# Patient Record
Sex: MALE | Race: BLACK OR AFRICAN AMERICAN | NOT HISPANIC OR LATINO
[De-identification: names, ages, dates, MRNs, and addresses within clinical notes are randomized per-mention and may not be internally consistent; named-entity substitution may affect disease eponyms.]

---

## 2022-04-01 ENCOUNTER — RESULT REVIEW (OUTPATIENT)
Age: 47
End: 2022-04-01

## 2022-04-01 ENCOUNTER — OUTPATIENT (OUTPATIENT)
Dept: OUTPATIENT SERVICES | Facility: HOSPITAL | Age: 47
LOS: 1 days | Discharge: HOME | End: 2022-04-01
Payer: OTHER MISCELLANEOUS

## 2022-04-01 VITALS
HEART RATE: 94 BPM | OXYGEN SATURATION: 98 % | TEMPERATURE: 97 F | HEIGHT: 69 IN | RESPIRATION RATE: 14 BRPM | SYSTOLIC BLOOD PRESSURE: 137 MMHG | DIASTOLIC BLOOD PRESSURE: 87 MMHG | WEIGHT: 250 LBS

## 2022-04-01 DIAGNOSIS — Z78.9 OTHER SPECIFIED HEALTH STATUS: Chronic | ICD-10-CM

## 2022-04-01 DIAGNOSIS — M25.511 PAIN IN RIGHT SHOULDER: ICD-10-CM

## 2022-04-01 DIAGNOSIS — Z01.818 ENCOUNTER FOR OTHER PREPROCEDURAL EXAMINATION: ICD-10-CM

## 2022-04-01 LAB
ALBUMIN SERPL ELPH-MCNC: 4.3 G/DL — SIGNIFICANT CHANGE UP (ref 3.5–5.2)
ALP SERPL-CCNC: 74 U/L — SIGNIFICANT CHANGE UP (ref 30–115)
ALT FLD-CCNC: 35 U/L — SIGNIFICANT CHANGE UP (ref 0–41)
ANION GAP SERPL CALC-SCNC: 12 MMOL/L — SIGNIFICANT CHANGE UP (ref 7–14)
AST SERPL-CCNC: 23 U/L — SIGNIFICANT CHANGE UP (ref 0–41)
BASOPHILS # BLD AUTO: 0.07 K/UL — SIGNIFICANT CHANGE UP (ref 0–0.2)
BASOPHILS NFR BLD AUTO: 0.7 % — SIGNIFICANT CHANGE UP (ref 0–1)
BILIRUB SERPL-MCNC: 0.5 MG/DL — SIGNIFICANT CHANGE UP (ref 0.2–1.2)
BUN SERPL-MCNC: 15 MG/DL — SIGNIFICANT CHANGE UP (ref 10–20)
CALCIUM SERPL-MCNC: 9.4 MG/DL — SIGNIFICANT CHANGE UP (ref 8.5–10.1)
CHLORIDE SERPL-SCNC: 102 MMOL/L — SIGNIFICANT CHANGE UP (ref 98–110)
CO2 SERPL-SCNC: 24 MMOL/L — SIGNIFICANT CHANGE UP (ref 17–32)
CREAT SERPL-MCNC: 1.1 MG/DL — SIGNIFICANT CHANGE UP (ref 0.7–1.5)
EGFR: 84 ML/MIN/1.73M2 — SIGNIFICANT CHANGE UP
EOSINOPHIL # BLD AUTO: 0.26 K/UL — SIGNIFICANT CHANGE UP (ref 0–0.7)
EOSINOPHIL NFR BLD AUTO: 2.7 % — SIGNIFICANT CHANGE UP (ref 0–8)
GLUCOSE SERPL-MCNC: 118 MG/DL — HIGH (ref 70–99)
HCT VFR BLD CALC: 44.7 % — SIGNIFICANT CHANGE UP (ref 42–52)
HGB BLD-MCNC: 15.2 G/DL — SIGNIFICANT CHANGE UP (ref 14–18)
IMM GRANULOCYTES NFR BLD AUTO: 0.4 % — HIGH (ref 0.1–0.3)
LYMPHOCYTES # BLD AUTO: 3.14 K/UL — SIGNIFICANT CHANGE UP (ref 1.2–3.4)
LYMPHOCYTES # BLD AUTO: 32.6 % — SIGNIFICANT CHANGE UP (ref 20.5–51.1)
MCHC RBC-ENTMCNC: 32.3 PG — HIGH (ref 27–31)
MCHC RBC-ENTMCNC: 34 G/DL — SIGNIFICANT CHANGE UP (ref 32–37)
MCV RBC AUTO: 94.9 FL — HIGH (ref 80–94)
MONOCYTES # BLD AUTO: 1.1 K/UL — HIGH (ref 0.1–0.6)
MONOCYTES NFR BLD AUTO: 11.4 % — HIGH (ref 1.7–9.3)
NEUTROPHILS # BLD AUTO: 5.02 K/UL — SIGNIFICANT CHANGE UP (ref 1.4–6.5)
NEUTROPHILS NFR BLD AUTO: 52.2 % — SIGNIFICANT CHANGE UP (ref 42.2–75.2)
NRBC # BLD: 0 /100 WBCS — SIGNIFICANT CHANGE UP (ref 0–0)
PLATELET # BLD AUTO: 207 K/UL — SIGNIFICANT CHANGE UP (ref 130–400)
POTASSIUM SERPL-MCNC: 3.8 MMOL/L — SIGNIFICANT CHANGE UP (ref 3.5–5)
POTASSIUM SERPL-SCNC: 3.8 MMOL/L — SIGNIFICANT CHANGE UP (ref 3.5–5)
PROT SERPL-MCNC: 7.1 G/DL — SIGNIFICANT CHANGE UP (ref 6–8)
RBC # BLD: 4.71 M/UL — SIGNIFICANT CHANGE UP (ref 4.7–6.1)
RBC # FLD: 13.2 % — SIGNIFICANT CHANGE UP (ref 11.5–14.5)
SODIUM SERPL-SCNC: 138 MMOL/L — SIGNIFICANT CHANGE UP (ref 135–146)
WBC # BLD: 9.63 K/UL — SIGNIFICANT CHANGE UP (ref 4.8–10.8)
WBC # FLD AUTO: 9.63 K/UL — SIGNIFICANT CHANGE UP (ref 4.8–10.8)

## 2022-04-01 PROCEDURE — 71046 X-RAY EXAM CHEST 2 VIEWS: CPT | Mod: 26

## 2022-04-01 PROCEDURE — 93010 ELECTROCARDIOGRAM REPORT: CPT

## 2022-04-01 NOTE — H&P PST ADULT - NSICDXPASTMEDICALHX_GEN_ALL_CORE_FT
PAST MEDICAL HISTORY:  Right shoulder pain      PAST MEDICAL HISTORY:  Marijuana user tarsha for 20 years    Right shoulder pain

## 2022-04-01 NOTE — H&P PST ADULT - REASON FOR ADMISSION
Suite: CASProceduralist: Bethel Dudley  Confirmed Surgery DateTime: 04-   Procedure: RIGHT SHOULDER ARTHROSCOPY ROTATOR CUFF REPAIR DECOMPRESSION  Laterality: RightLength of Procedure: 60 MinutesAnesthesia Type: General

## 2022-04-01 NOTE — H&P PST ADULT - HISTORY OF PRESENT ILLNESS
46 yo male presents for PAST in preparation for RIGHT SHOULDER ARTHROSCOPY ROTATOR CUFF REPAIR DECOMPRESSION  Pt complains of intermittent chronic right shoulder pain for months.  S/P fall 6/29/2021-workmen's comp injury  Pt reports pain 4/10 with rest, and 9 /10 with activity. Pt takes Marijuana for pain with relief. Pain is localized and doen's radiates to other parts of the body. Smokers daily for 20 years.    Denies any chest pain, difficulty breathing, SOB, palpitations, dysuria, URI, or any other infections in the last 2 weeks/1 month. Denies any recent travel, contact, or exposure to any persons with known or suspected COVID-19. Pt also denies COVID testing within the last 2 weeks. Pt advised to self quarantine until day of procedure. Exercise tolerance of 2-3 flights of stairs without dyspnea. MARGUERITE reviewed with patient.  Pt can walk for 0.5 mile w/o SOB  Anesthesia Alert  NO--Difficult Airway  NO--History of neck surgery or radiation  NO--Limited ROM of neck  NO--History of Malignant hyperthermia  NO--Personal or family history of Pseudocholinesterase deficiency.  NO--Prior Anesthesia Complication  NO--Latex Allergy  NO--Loose teeth  NO--History of Rheumatoid Arthritis  NO--MARGUERITE  NO--Bleeding risk  NO--Other_____   written and verbal instructions with teach back on chlorhexidine shampoo provided,  pt verbalized understanding with returned demonstration  Patient verbalized understanding of instructions and was given the opportunity to ask questions and have them answered.   46 yo male presents for PAST in preparation for RIGHT SHOULDER ARTHROSCOPY ROTATOR CUFF REPAIR DECOMPRESSION  Pt complains of intermittent chronic right shoulder pain for months.  S/P fall 6/29/2021-workmen's comp injury  Pt reports pain 4/10 with rest, and 9 /10 with activity. Pt takes Marijuana for pain with relief. Pain is localized and doen's radiates to other parts of the body. Smokes marihuana daily for 20 years. CXRAY pending    Denies any chest pain, difficulty breathing, SOB, palpitations, dysuria, URI, or any other infections in the last 2 weeks/1 month. Denies any recent travel, contact, or exposure to any persons with known or suspected COVID-19. Pt also denies COVID testing within the last 2 weeks. Pt advised to self quarantine until day of procedure. Exercise tolerance of 2-3 flights of stairs without dyspnea. MARGUERITE reviewed with patient.  Pt can walk for 0.5 mile w/o SOB  Anesthesia Alert  NO--Difficult Airway  NO--History of neck surgery or radiation  NO--Limited ROM of neck  NO--History of Malignant hyperthermia  NO--Personal or family history of Pseudocholinesterase deficiency.  NO--Prior Anesthesia Complication  NO--Latex Allergy  NO--Loose teeth  NO--History of Rheumatoid Arthritis  NO--MARGUERITE  NO--Bleeding risk  NO--Other_____   written and verbal instructions with teach back on chlorhexidine shampoo provided,  pt verbalized understanding with returned demonstration  Patient verbalized understanding of instructions and was given the opportunity to ask questions and have them answered.

## 2022-04-05 PROBLEM — Z00.00 ENCOUNTER FOR PREVENTIVE HEALTH EXAMINATION: Status: ACTIVE | Noted: 2022-04-05

## 2022-04-14 NOTE — ASU PATIENT PROFILE, ADULT - FALL HARM RISK - UNIVERSAL INTERVENTIONS
Bed in lowest position, wheels locked, appropriate side rails in place/Call bell, personal items and telephone in reach/Instruct patient to call for assistance before getting out of bed or chair/Non-slip footwear when patient is out of bed/[place] to call system/Physically safe environment - no spills, clutter or unnecessary equipment/Purposeful Proactive Rounding/Room/bathroom lighting operational, light cord in reach

## 2022-04-15 ENCOUNTER — RESULT REVIEW (OUTPATIENT)
Age: 47
End: 2022-04-15

## 2022-04-15 ENCOUNTER — OUTPATIENT (OUTPATIENT)
Dept: OUTPATIENT SERVICES | Facility: HOSPITAL | Age: 47
LOS: 1 days | Discharge: HOME | End: 2022-04-15
Payer: OTHER MISCELLANEOUS

## 2022-04-15 ENCOUNTER — TRANSCRIPTION ENCOUNTER (OUTPATIENT)
Age: 47
End: 2022-04-15

## 2022-04-15 VITALS
TEMPERATURE: 99 F | RESPIRATION RATE: 20 BRPM | HEIGHT: 69 IN | OXYGEN SATURATION: 97 % | WEIGHT: 250 LBS | HEART RATE: 72 BPM | DIASTOLIC BLOOD PRESSURE: 65 MMHG | SYSTOLIC BLOOD PRESSURE: 104 MMHG

## 2022-04-15 VITALS
RESPIRATION RATE: 20 BRPM | OXYGEN SATURATION: 97 % | DIASTOLIC BLOOD PRESSURE: 74 MMHG | SYSTOLIC BLOOD PRESSURE: 127 MMHG | HEART RATE: 82 BPM

## 2022-04-15 DIAGNOSIS — M25.811 OTHER SPECIFIED JOINT DISORDERS, RIGHT SHOULDER: ICD-10-CM

## 2022-04-15 DIAGNOSIS — M24.111 OTHER ARTICULAR CARTILAGE DISORDERS, RIGHT SHOULDER: ICD-10-CM

## 2022-04-15 DIAGNOSIS — M19.011 PRIMARY OSTEOARTHRITIS, RIGHT SHOULDER: ICD-10-CM

## 2022-04-15 DIAGNOSIS — Z87.39 PERSONAL HISTORY OF OTHER DISEASES OF THE MUSCULOSKELETAL SYSTEM AND CONNECTIVE TISSUE: ICD-10-CM

## 2022-04-15 DIAGNOSIS — M65.811 OTHER SYNOVITIS AND TENOSYNOVITIS, RIGHT SHOULDER: ICD-10-CM

## 2022-04-15 DIAGNOSIS — M75.51 BURSITIS OF RIGHT SHOULDER: ICD-10-CM

## 2022-04-15 DIAGNOSIS — M75.111 INCOMPLETE ROTATOR CUFF TEAR OR RUPTURE OF RIGHT SHOULDER, NOT SPECIFIED AS TRAUMATIC: ICD-10-CM

## 2022-04-15 DIAGNOSIS — Z78.9 OTHER SPECIFIED HEALTH STATUS: Chronic | ICD-10-CM

## 2022-04-15 PROCEDURE — 88304 TISSUE EXAM BY PATHOLOGIST: CPT | Mod: 26

## 2022-04-15 RX ORDER — SODIUM CHLORIDE 9 MG/ML
1000 INJECTION, SOLUTION INTRAVENOUS
Refills: 0 | Status: DISCONTINUED | OUTPATIENT
Start: 2022-04-15 | End: 2022-04-29

## 2022-04-15 RX ORDER — HYDROMORPHONE HYDROCHLORIDE 2 MG/ML
0.5 INJECTION INTRAMUSCULAR; INTRAVENOUS; SUBCUTANEOUS ONCE
Refills: 0 | Status: DISCONTINUED | OUTPATIENT
Start: 2022-04-15 | End: 2022-04-15

## 2022-04-15 RX ADMIN — SODIUM CHLORIDE 100 MILLILITER(S): 9 INJECTION, SOLUTION INTRAVENOUS at 14:42

## 2022-04-15 NOTE — ASU DISCHARGE PLAN (ADULT/PEDIATRIC) - PATIENT BELONGINGS
None    Number of children: None    Years of education: None    Highest education level: None   Occupational History    None   Social Needs    Financial resource strain: None    Food insecurity     Worry: None     Inability: None    Transportation needs     Medical: None     Non-medical: None   Tobacco Use    Smoking status: Never Smoker    Smokeless tobacco: Never Used   Substance and Sexual Activity    Alcohol use:  Yes    Drug use: No    Sexual activity: None   Lifestyle    Physical activity     Days per week: None     Minutes per session: None    Stress: None   Relationships    Social connections     Talks on phone: None     Gets together: None     Attends Christianity service: None     Active member of club or organization: None     Attends meetings of clubs or organizations: None     Relationship status: None    Intimate partner violence     Fear of current or ex partner: None     Emotionally abused: None     Physically abused: None     Forced sexual activity: None   Other Topics Concern    None   Social History Narrative    None     Family History   Problem Relation Age of Onset    Kidney Disease Mother     High Blood Pressure Mother     Other Mother         brain aneurysm    Other Father         parkinsons    Cancer Father         prostate    Kidney Disease Sister     High Blood Pressure Sister     Other Sister         brain aneurysm    Other Brother         brain aneurysm    High Blood Pressure Brother             Review Of Systems (unless otherwise specified)  Gen: No fevers, sweats, chills   No fatigue   No weight unintentional weight changes  Skin:  No rash, no lesion  Resp: No cough, shortness of breath, wheeze, chest congestion  CV: No chest pain, palpitation, edema   GI:  No abdominal pain   No nausea, no vomiting   No change in bowels, no diarrhea or constipation   No blood in stool or blood per rectum  Neuro: No headaches   No syncope/near syncope   No dizziness  MS: No back pain   No arthralgias   No myalgias   No gait issues  : No dysuria, no hematuria, no frequency, no incontinence  Eyes: No vision changes   No eye itching  ENT: No earache, no drainage   No nasal congestion   No sinus tenderness   No sore throat   No swallowing issues  Endo: No polyuria, polydipsia, polyphagia   No temperature intolerance  Psych: No mood changes   No dysphoria   No anxiety   No sleep disturbance   No suicidal or homicidal ideation            PHYSICAL EXAMINATION:  /73   Pulse 77   Temp 97.4 °F (36.3 °C) (Temporal)   Resp 20   Ht 5' 5\" (1.651 m)   Wt 210 lb (95.3 kg)   LMP 10/16/2020   BMI 34.95 kg/m²   General: Well nourished, well developed, no acute distress  Eyes:  PERRL, EOMI  ENT:  Nasal:  No mucosal edema     No nasal drainage   Oral:  mucosa moist and pink             no posterior pharyngeal drainage     no posterior pharyngeal exudate  Neck:  Supple   No palpable cervical lymphoadenopathy   Thyroid without mass or enlargement  Resp: Lungs CTAB   Equal and adequate air exchange without accessory muscle use   No rales, rhonchi or wheeze  CV: S1S2 RRR   No murmur   Intact distal pulses  GI: Abdomen Soft, non tender, non distended   Normoactive bowel sounds   No palpable hepatosplenomegaly  MS: Physiologic ROM of all extremities    Intact distal pulses   No clubbing, cyanosis or edema  Skin Warm and dry; no rash or lesion  Neuro: Alert and oriented; motor and sensation intact           Lab Results   Component Value Date    WBC 5.6 07/24/2018    HGB 15.1 07/24/2018    HCT 46.5 07/24/2018     07/24/2018    CHOL 174 07/24/2018    TRIG 99 07/24/2018    HDL 63 07/24/2018    ALT 16 07/24/2018    AST 19 07/24/2018     07/24/2018    K 4.6 07/24/2018     07/24/2018    CREATININE 0.8 07/24/2018    BUN 9 07/24/2018    CO2 25 07/24/2018    TSH 1.190 07/24/2018          I have reviewed this patient's previous records. I have reviewed this patient's labs.     I have reviewed this patient's imaging reports showing brain MRA not concerning     I have reviewed this patient's medications. Casimiro Gates was seen today for establish care. Diagnoses and all orders for this visit:    Annual physical exam    Other fatigue  -     HIV Screen; Future  -     COMPREHENSIVE METABOLIC PANEL; Future    Screen for colon cancer  -     Cologuard (For External Results Only); Future          Return in about 1 year (around 10/23/2021) for annual physical.         Anticipatory guidance and preventative health education provided to pt today. Appropriate labs ordered. Immunization status dicussed and updated as appropriate.         Electronically signed by Esa Bob MD on 10/23/2020 at 10:40 AM Patient's belongings returned

## 2022-04-15 NOTE — CHART NOTE - NSCHARTNOTEFT_GEN_A_CORE
PACU ANESTHESIA ADMISSION NOTE      Procedure: Repair, shoulder, SLAP lesion    Arthroscopic debridement, shoulder, extensive    Arthroscopic claviculectomy    Subacromial decompression      Post op diagnosis:  History of rotator cuff tear        ____  Intubated  TV:______       Rate: ______      FiO2: ______    _x___  Patent Airway    _x___  Full return of protective reflexes    _x___  Full recovery from anesthesia / back to baseline status    Vitals:  T 96 f  HR: 97  BP: 156/83  RR: 18  SpO2: 94% on FM 8 L/min    Mental Status:  ____ Awake   _____ Alert   __x___ Drowsy   ____x_ Sedated    Nausea/Vomiting:  _x___  NO       ______Yes,   See Post - Op Orders         Pain Scale (0-10):  __0___    Treatment: _x___ None    ____ See Post - Op/PCA Orders    Post - Operative Fluids:   __x__ Oral   ____ See Post - Op Orders    Plan: Discharge:   _x___Home       _____Floor     _____Critical Care    _____  Other:_________________    Comments: uneventful GETA/regional, VSS, full report to pacu rn  No anesthesia issues or complications noted.  Discharge when criteria met.

## 2022-04-15 NOTE — PRE-ANESTHESIA EVALUATION ADULT - NSANTHADDINFOFT_GEN_ALL_CORE
Discussed risks and benefits of anesthesia including but not limited to the risk of sore throat, N/V, damage to mouth, teeth and lips, stroke, MI and even death.  Patient demonstrates understanding, all questions answered. The patient wishes to proceed with planned treatment. Discussed risks/benefits of nerve block including pneumothorax, damage to nerves/arteries/veins, block failure.

## 2022-04-15 NOTE — ASU PREOP CHECKLIST - ADVANCE DIRECTIVE ADDRESSED/READDRESSED
Health Maintenance Due   Topic Date Due   • Influenza Vaccine (1) 09/01/2020   • Depression Screening  06/11/2021       Patient is due for topics as listed above but is not proceeding with Immunization(s) Influenza at this time. Education provided for above.                     done

## 2022-04-15 NOTE — ASU DISCHARGE PLAN (ADULT/PEDIATRIC) - ASU DC SPECIAL INSTRUCTIONSFT
Post Operative Instructions for Shoulder Surgery    Your Surgery Included  [ ] Rotator Cuff Repair			  [ x] Debridement				  [ ] Biceps Tenodesis/Tenotomy	  [ x] Distal Clavicle Resection		  [ x] SLAP Repair  [ ] Instability Repair  [ ] Lysis of Adhesions/Manipulation  [ ] Other:  	  Call our office ([phone]) immediately if you experience any of the following:  •	Excessive bleeding or pus like drainage at the incision site  •	Uncontrollable pain not relieved by pain medication  •	Excessive swelling or redness at the incision site  •	Fever above 101.5 degrees not controlled with Tylenol or Motrin  •	Shortness of Breath  •	Any foul odor or blistering from the surgery site    Pain Management: You were given one or more of the following medication prescriptions before leaving the hospital. Have the prescriptions filled at a pharmacy on your way home and follow the instructions on the bottles.   Regional Anesthesia Injections (Blocks): You may have been given a regional nerve block either before or after surgery. This may numb your shoulder for 24-36 hours    Diet: Eat a bland diet for the first day after surgery. Progress your diet as tolerated. Constipation may occur with Narcotic usage, contact our office if you are experiencing constipation.    Activity: After you arrive at home, spend most of the first 24 hours resting in bed, on the couch, or in a reclining chair. After the first 24 hours at home, slowly increase your activity level based on your symptoms.    Dressing Change: Remove the dressing on the 3rd day. It is normal for some blood to be seen on the dressings. It is also normal for you to see apparent bruising on the skin around your shoulder when you remove the dressing. If present, leave the steri-strip tape across the incisions. If you are concerned by the drainage or the appearance of your shoulder, please call one of the numbers listed below. Keep incisions covered with Band-Aids/bandages.    Showering: You may shower on the 5th day after surgery if the wound is dry and clean, but do not let the wound soak in water until sutures are removed. Do not submerge in any water until after your postoperative appointment in clinic.    Shoulder Sling: You may have been sent home with a sling / pillow attachment holding your arm away from your body. You may remove the sling when changing clothes or bathing. Make sure to wear the sling while sleeping unless instructed otherwise. You may remove for exercises.    Shoulder Exercises: You may do these exercises for 2-5 mins five times a day in order to help regain your range of motion.  [x ] Shoulder Shrugs: Shrug your shoulders up and down  [x ] Pendulums: Bend forward allowing your arm to hang down in front of you. Gently swing your arm side-to-side and front to back  [x ] Elbow range of motion: Straighten and bend your elbow  [x ] Scapula Retractions: Squeeze shoulder blades together while slightly pulling them down  [ ] Passive Abduction: Have family member lift your arm away from your body bringing your elbow to the level of your shoulder  [ ] Shoulder rotation: With your arm at your side, have a family member rotate your arm internally and externally  [ ] Pulley exercises: Put a towel over the top of a door and face the door, use your good arm to pull your arm up in front of you    Follow Up: As Scheduled

## 2022-04-15 NOTE — BRIEF OPERATIVE NOTE - NSICDXBRIEFPROCEDURE_GEN_ALL_CORE_FT
PROCEDURES:  Repair, shoulder, SLAP lesion 15-Apr-2022 14:06:41  Bethel Dudley  Arthroscopic debridement, shoulder, extensive 15-Apr-2022 14:07:21  Bethel Dudley  Arthroscopic claviculectomy 15-Apr-2022 14:07:35  Bethel Dudley  Subacromial decompression 15-Apr-2022 14:07:44  Bethel Dudley

## 2022-04-19 LAB — SURGICAL PATHOLOGY STUDY: SIGNIFICANT CHANGE UP

## 2022-09-10 NOTE — ASU PATIENT PROFILE, ADULT - VISION (WITH CORRECTIVE LENSES IF THE PATIENT USUALLY WEARS THEM):
Lab orders placed  Lab orders not faxed, as pt uses a [facility] lab          
[facility]      Caller: Levi Aguilar    Where labs will be completed: [facility] in [place]  Fax/Phone number for lab:[phone]  Upcoming Appointment Date: 9/22/22  Callback Number: [phone] (home)       Thank you    -Fazal ARANDA    
Normal vision: sees adequately in most situations; can see medication labels, newsprint

## 2022-10-31 ENCOUNTER — NON-APPOINTMENT (OUTPATIENT)
Age: 47
End: 2022-10-31

## 2022-10-31 ENCOUNTER — APPOINTMENT (OUTPATIENT)
Dept: ORTHOPEDIC SURGERY | Facility: CLINIC | Age: 47
End: 2022-10-31

## 2022-10-31 VITALS — WEIGHT: 250 LBS | HEIGHT: 69 IN | BODY MASS INDEX: 37.03 KG/M2

## 2022-10-31 PROBLEM — M25.511 PAIN IN RIGHT SHOULDER: Chronic | Status: ACTIVE | Noted: 2022-04-01

## 2022-10-31 PROBLEM — F12.90 CANNABIS USE, UNSPECIFIED, UNCOMPLICATED: Chronic | Status: ACTIVE | Noted: 2022-04-01

## 2022-10-31 PROCEDURE — 99072 ADDL SUPL MATRL&STAF TM PHE: CPT | Mod: ACP

## 2022-10-31 PROCEDURE — 73502 X-RAY EXAM HIP UNI 2-3 VIEWS: CPT | Mod: LT,ACP

## 2022-10-31 PROCEDURE — 99203 OFFICE O/P NEW LOW 30 MIN: CPT | Mod: ACP

## 2022-10-31 PROCEDURE — 73030 X-RAY EXAM OF SHOULDER: CPT | Mod: LT,ACP

## 2022-10-31 PROCEDURE — 73130 X-RAY EXAM OF HAND: CPT | Mod: RT

## 2022-10-31 NOTE — IMAGING
[de-identified] :  on examination, patient is a 47-year-old male alert oriented in no apparent distress well nourished,  normal gait.  \par On examination patient has decreased range of motion to the cervical spine to flexion and rotation to the left.  \par Patient has tenderness when palpating over the left trapezius and muscle spasm palpated.  \par Patient also has tenderness and muscle spasm over the left rhomboids.  \par Patient has decreased range of motion to the left shoulder through the Apley scratch when compared to the right.  \par Patient is only able to forward flex to about  100° and with internal rotation he is only able to raise his buttocks.  \par Tenderness over the bicipital groove and over the posterior aspect of the shoulder.  \par Negative drop-arm test.  \par  positive apprehension.  \par Positive impingement positive cross abduction of the shoulder.  \par Neurovascular intact.\par Good motor strength to internal external rotation with end pain.\par \par   Examination of the left hip, patient has 10 to palpation on about the hip flexors, patient has some discomfort when palpating over the iliac crest and over the left  lumbar paraspinal muscles.  \par Patient has positive straight leg raise.  \par Patient has groin pain with hip flexion.  \par Groin pain with resisted hip flexion.  \par Pain with internal and external rotation.  \par Nontender the trochanteric bursa.  \par Neurovascular intact.  \par \par Examination of the  left ankle and left knee are unremarkable except for tenderness over the lateral aspect of the ankle.  \par No swelling, no ecchymosis, no signs of instability about the ankle.  \par Examination of the knee appears to be unremarkable.  Good range of motion, good motor strength.  No signs of instability.\par \par  examination of the right hand, mild swelling over the dorsal aspect of the hand on about the 2nd and 3rd metacarpals, patient has good range of motion of the digits.  \par Patient has mild laxity to the ulnar collateral ligament of the MCP joint.  \par  tenderness to palpation over the MCP joint.  \par Neurovascular intact.\par \par   X-ray of the left shoulder:   Negative for any acute fracture or dislocation.\par \par   X-ray of the left hip and pelvis:  Negative for any acute fracture or dislocation. There is a pains abnormal the observed on bilateral hip.\par \par   X-rays right hand: negative for any  acute fracture or dislocation.

## 2022-10-31 NOTE — HISTORY OF PRESENT ILLNESS
[de-identified] :   47-year-old male here for an evaluation of injury sustained to his right side of his body and to the right hand, patient states that on October 30, 2022, he was at work he was coming down the stairs  carrying some items on his hand when he fell down, he states that he fell down about 13 steps injuring his right hand and mostly the right side of his body.  \par Patient states that the pain on his right shoulder is on the top of the shoulder radiates around the  shoulder blade and to his right arm.  \par Patient states that the pain on his hip radiates down to his leg and on to his right side of the  ride side of his waist.\par  patient states that he has pain the right hand, he is unable to fully make a fist and significant pain on about the call of the index finger.

## 2022-10-31 NOTE — DISCUSSION/SUMMARY
[de-identified] : IMPRESSION:   Left shoulder strain and or rotator cuff injury, possibly due to cervical radiculopathy.  \par Left trapezius strain.  \par Left hip pain possibly due to strain.  \par Right hand pain possibly due to sprain  to the right MCP  joint of the index finger.\par \par \par PLAN:   Physical therapy.  \par Muscle relaxant and anti-inflammatory.  \par Patient was advised for an MRI of the left shoulder to rule out a possible rotator cuff injury.\par Patient was advised body taping of the right hand to the 3rd and 4th digit.  \par Patient was advised to do activities as tolerated.  \par This time he is total temporary disabled unable to return to work until further notice.\par \par \par FOLLOW-UP: 4 weeks\par \par \par \par SUPERVISING PHYSICIAN DR. CALDWELL.

## 2022-11-14 ENCOUNTER — FORM ENCOUNTER (OUTPATIENT)
Age: 47
End: 2022-11-14

## 2022-12-02 ENCOUNTER — NON-APPOINTMENT (OUTPATIENT)
Age: 47
End: 2022-12-02

## 2022-12-02 ENCOUNTER — APPOINTMENT (OUTPATIENT)
Dept: ORTHOPEDIC SURGERY | Facility: CLINIC | Age: 47
End: 2022-12-02

## 2022-12-02 DIAGNOSIS — S49.92XA UNSPECIFIED INJURY OF LEFT SHOULDER AND UPPER ARM, INITIAL ENCOUNTER: ICD-10-CM

## 2022-12-02 DIAGNOSIS — S46.812A STRAIN OF OTHER MUSCLES, FASCIA AND TENDONS AT SHOULDER AND UPPER ARM LEVEL, LEFT ARM, INITIAL ENCOUNTER: ICD-10-CM

## 2022-12-02 DIAGNOSIS — S63.650A SPRAIN OF METACARPOPHALANGEAL JOINT OF RIGHT INDEX FINGER, INITIAL ENCOUNTER: ICD-10-CM

## 2022-12-02 PROCEDURE — 99072 ADDL SUPL MATRL&STAF TM PHE: CPT

## 2022-12-02 PROCEDURE — 20610 DRAIN/INJ JOINT/BURSA W/O US: CPT | Mod: LT

## 2022-12-02 PROCEDURE — 99213 OFFICE O/P EST LOW 20 MIN: CPT | Mod: ACP,25

## 2022-12-02 NOTE — IMAGING
[de-identified] : Diminished on examination, patient is a 47-year-old male alert oriented in no apparent distress well nourished,  normal gait.  \par On examination patient has decreased range of motion to the cervical spine to flexion and rotation to the left.  \par Patient has tenderness when palpating over the left trapezius and muscle spasm palpated.  \par Patient also has tenderness and muscle spasm over the left rhomboids.  \par Patient has decreased range of motion to the left shoulder through the Apley scratch when compared to the right.  \par Patient is only able to forward flex to about  115° and with internal rotation he is only able to raise his buttocks.  \par Tenderness over the bicipital groove and over the posterior aspect of the shoulder.  \par Negative drop-arm test.  \par  positive apprehension.  \par Positive impingement positive cross abduction of the shoulder.  \par Neurovascular intact.\par Good motor strength to internal external rotation with end pain.\par \par \par \par \par  examination of the right hand, mild swelling over the dorsal aspect of the hand on about the 2nd and 3rd metacarpals, patient has good range of motion of the digits.  \par Patient has mild laxity to the ulnar collateral ligament of the MCP joint.  \par  tenderness to palpation over the MCP joint.  \par Neurovascular intact.\par \par

## 2022-12-02 NOTE — PROCEDURE
[Large Joint Injection] : Large joint injection [Left] : of the left [Shoulder] : shoulder [Alcohol] : alcohol [Ethyl Chloride sprayed topically] : ethyl chloride sprayed topically [Sterile technique used] : sterile technique used [___ cc    1%] : Lidocaine ~Vcc of 1%  [___ cc    4mg] : Dexamethasone (Decadron) ~Vcc of 4 mg  [] : Patient tolerated procedure well

## 2022-12-02 NOTE — WORK
[Total] : total [Does not reveal pre-existing condition(s) that may affect treatment/prognosis] : does not reveal pre-existing condition(s) that may affect treatment/prognosis [Cannot return to work because ________] : cannot return to work because [unfilled] [Unknown at this time] : : unknown at this time [I provided the services listed above] :  I provided the services listed above. [FreeTextEntry3] : Soraida Riojas

## 2022-12-02 NOTE — HISTORY OF PRESENT ILLNESS
[de-identified] :   47-year-old male here for a repeat evaluation of injury sustained to his right side of his body and to the right hand, patient states that on October 30, 2022, he was at work he was coming down the stairs  carrying some items on his hand when he fell down, he states that he fell down about 13 steps injuring his right hand and mostly the right side of his body.  \par Patient states that the pain on his right shoulder is on the top of the shoulder radiates around the  shoulder blade and to his right arm.  \par Patient states that the pain only left hip has improved, this time the pain in his hip is about 3/10.\par Patient said the pain in his hand is dull constant 7/10.\par Patient continued having pain on his cervical spine radiating to the shoulder and to the upper back.\par Patient states that the worst pain is each shoulder, the pain is constant, worse at nighttime, pain is 8-10 over 10\par

## 2022-12-06 RX ORDER — DICLOFENAC SODIUM 75 MG/1
75 TABLET, DELAYED RELEASE ORAL
Qty: 60 | Refills: 0 | Status: ACTIVE | COMMUNITY
Start: 2022-10-31 | End: 1900-01-01

## 2023-01-11 ENCOUNTER — APPOINTMENT (OUTPATIENT)
Dept: ORTHOPEDIC SURGERY | Facility: CLINIC | Age: 48
End: 2023-01-11

## 2023-01-12 ENCOUNTER — APPOINTMENT (OUTPATIENT)
Dept: ORTHOPEDIC SURGERY | Facility: CLINIC | Age: 48
End: 2023-01-12
Payer: OTHER MISCELLANEOUS

## 2023-01-12 PROCEDURE — 99214 OFFICE O/P EST MOD 30 MIN: CPT | Mod: 57

## 2023-01-12 PROCEDURE — 99072 ADDL SUPL MATRL&STAF TM PHE: CPT

## 2023-01-16 NOTE — HISTORY OF PRESENT ILLNESS
[de-identified] : Patient is here for evaluation of left shoulder pain\par Affecting quality of life\par Has pain and weakness with loss of rom\par Wakes up at night due to pain\par \par WC related injury. Has done PT, medications, and cortisone injection. No improvement in pain or function.\par \par NAD\par Left shoulder:\par TTP ant GH joint, bicipital groove\par FF 0-140 (passive 175)\par ER 40\par IR L5\par Pain with terminal rom\par Weakness to abduction and ER\par Pos Impingement\par Pos Boogie\par Pos Cross Arm Adduction\par Negative instability\par Positive scapula dyskinesia\par \par XRay Left shoulder negative for fracture, dislocation, arthritis\par \par mri left shoulder: high grade pRCT, SLAP tear, ac jt arthritis\par \par Plan\par went over findings with pt\par explained the mri in detail\par op vs nonop explained\par sicne he has failed cons tx and pain and function are not improving, will proceed with surgery\par r/b/a explained\par will proceed with\par left shoulder arthroscopy, rotator cuff repair, decompression, distal clavicle excision, possible open biceps tenodesis\par \par Operative and nonoperative options discussed with patient. Surgical risks, benefits, and alternatives explained. Surgical risks include but are not exclusive to bleeding, infection, neurovascular damage, continued pain, stiffness, scarring, rsd, dvt/pe, potential failure of surgery that may require further surgery in the future. I went over incisions and rehabilitation. All questions answered. \par \par

## 2023-01-19 ENCOUNTER — FORM ENCOUNTER (OUTPATIENT)
Age: 48
End: 2023-01-19

## 2023-02-13 ENCOUNTER — APPOINTMENT (OUTPATIENT)
Dept: ORTHOPEDIC SURGERY | Facility: CLINIC | Age: 48
End: 2023-02-13

## 2023-03-17 ENCOUNTER — APPOINTMENT (OUTPATIENT)
Dept: ORTHOPEDIC SURGERY | Facility: AMBULATORY SURGERY CENTER | Age: 48
End: 2023-03-17

## 2023-03-24 ENCOUNTER — APPOINTMENT (OUTPATIENT)
Dept: ORTHOPEDIC SURGERY | Facility: CLINIC | Age: 48
End: 2023-03-24

## 2023-04-05 ENCOUNTER — LABORATORY RESULT (OUTPATIENT)
Age: 48
End: 2023-04-05

## 2023-04-08 NOTE — ASU PREOP CHECKLIST - ANTIBIOTIC
Pt c/o left ear pain with decrease in hearing. Is currently taking abx for ear infection. Was also seen on 4/6 and had a wick placed which came out about 1 hour after discharge.        Triage Assessment     Row Name 04/08/23 0211       Triage Assessment (Adult)    Airway WDL WDL       Respiratory WDL    Respiratory WDL WDL       Skin Circulation/Temperature WDL    Skin Circulation/Temperature WDL WDL       Cardiac WDL    Cardiac WDL WDL       Peripheral/Neurovascular WDL    Peripheral Neurovascular WDL WDL       Cognitive/Neuro/Behavioral WDL    Cognitive/Neuro/Behavioral WDL WDL              
n/a

## 2023-04-19 ENCOUNTER — APPOINTMENT (OUTPATIENT)
Dept: ORTHOPEDIC SURGERY | Facility: AMBULATORY SURGERY CENTER | Age: 48
End: 2023-04-19
Payer: OTHER MISCELLANEOUS

## 2023-04-19 PROCEDURE — 29824 SHO ARTHRS SRG DSTL CLAVICLC: CPT | Mod: LT

## 2023-04-19 PROCEDURE — 23430 REPAIR BICEPS TENDON: CPT | Mod: LT

## 2023-04-19 PROCEDURE — 29826 SHO ARTHRS SRG DECOMPRESSION: CPT | Mod: LT

## 2023-04-19 PROCEDURE — 29827 SHO ARTHRS SRG RT8TR CUF RPR: CPT | Mod: LT

## 2023-04-20 RX ORDER — OXYCODONE AND ACETAMINOPHEN 5; 325 MG/1; MG/1
5-325 TABLET ORAL
Qty: 30 | Refills: 0 | Status: ACTIVE | COMMUNITY
Start: 2023-04-19 | End: 1900-01-01

## 2023-04-20 RX ORDER — HYDROCODONE BITARTRATE AND ACETAMINOPHEN 7.5; 3 MG/1; MG/1
7.5-3 TABLET ORAL
Qty: 30 | Refills: 0 | Status: DISCONTINUED | COMMUNITY
Start: 2023-04-20 | End: 2023-04-20

## 2023-04-27 ENCOUNTER — APPOINTMENT (OUTPATIENT)
Dept: ORTHOPEDIC SURGERY | Facility: CLINIC | Age: 48
End: 2023-04-27
Payer: OTHER MISCELLANEOUS

## 2023-04-27 DIAGNOSIS — M25.552 PAIN IN LEFT HIP: ICD-10-CM

## 2023-04-27 PROCEDURE — 99024 POSTOP FOLLOW-UP VISIT: CPT

## 2023-04-28 PROBLEM — M25.552 LEFT HIP PAIN: Status: ACTIVE | Noted: 2022-10-31

## 2023-04-28 NOTE — HISTORY OF PRESENT ILLNESS
[de-identified] : Pt is s/p left shoulder arthroscopy\par Doing well.\par Pain controlled\par \par NAD\par Left shoulder\par Incisions healed\par Mild diffuse TTP\par Compartments soft and NT\par ROM limited secondary to pain\par NVI\par \par s/p left shoulder arthroscopy\par went over the surgery in detail\par will start pt, protocol provided\par continue pain control as needed\par fu in 1 month\par all questions answered\par

## 2023-06-05 NOTE — H&P PST ADULT - NSALCOHOLTYPE_GEN__A_CORE_SD
Caller: Davi Garcia    Relationship: Self    Best call back number: [phone]    What is the medical concern/diagnosis: COLONOSCOPY    What is the provider, practice or medical service name: [facility]    What is the office location: [place]    What is the office phone number: [phone]    
PT STATES WANTS TO SEE DR. BAEZA FOR COLONSCOPY. STATES IS CAN'T GET IN WILL GO AND SEE DR. LEES   
liquor

## 2023-06-08 ENCOUNTER — APPOINTMENT (OUTPATIENT)
Dept: ORTHOPEDIC SURGERY | Facility: CLINIC | Age: 48
End: 2023-06-08

## 2023-06-30 RX ORDER — HYDROCODONE BITARTRATE AND ACETAMINOPHEN 10; 325 MG/1; MG/1
10-325 TABLET ORAL
Qty: 15 | Refills: 0 | Status: ACTIVE | COMMUNITY
Start: 2023-04-20 | End: 1900-01-01

## 2023-08-04 ENCOUNTER — APPOINTMENT (OUTPATIENT)
Dept: ORTHOPEDIC SURGERY | Facility: CLINIC | Age: 48
End: 2023-08-04
Payer: OTHER MISCELLANEOUS

## 2023-08-04 PROCEDURE — 99213 OFFICE O/P EST LOW 20 MIN: CPT

## 2023-08-07 NOTE — HISTORY OF PRESENT ILLNESS
[de-identified] : Pt is s/p left shoulder arthroscopy Doing well. Pain controlled  Still having pain and soreness  NAD Left shoulder Incisions healed Mild diffuse TTP Compartments soft and NT  ER 30 IR L5 NVI  s/p left shoulder arthroscopy went over the surgery in detail needs PT, this is medically necessary to improve rom and function tramadol and mobic sent for pain fu in 6 weeks not working with temp total deg of disability

## 2023-09-14 ENCOUNTER — APPOINTMENT (OUTPATIENT)
Dept: ORTHOPEDIC SURGERY | Facility: CLINIC | Age: 48
End: 2023-09-14
Payer: OTHER MISCELLANEOUS

## 2023-09-14 VITALS — WEIGHT: 250 LBS | BODY MASS INDEX: 37.03 KG/M2 | HEIGHT: 69 IN

## 2023-09-14 PROCEDURE — 99213 OFFICE O/P EST LOW 20 MIN: CPT

## 2023-10-06 ENCOUNTER — APPOINTMENT (OUTPATIENT)
Dept: ORTHOPEDIC SURGERY | Facility: CLINIC | Age: 48
End: 2023-10-06
Payer: OTHER MISCELLANEOUS

## 2023-10-06 PROCEDURE — 99214 OFFICE O/P EST MOD 30 MIN: CPT

## 2023-10-06 PROCEDURE — 72110 X-RAY EXAM L-2 SPINE 4/>VWS: CPT

## 2023-10-06 RX ORDER — TRAMADOL HYDROCHLORIDE 50 MG/1
50 TABLET, COATED ORAL
Qty: 20 | Refills: 0 | Status: ACTIVE | COMMUNITY
Start: 2023-08-04 | End: 1900-01-01

## 2023-11-19 NOTE — PRE-ANESTHESIA EVALUATION ADULT - NSPREOPDXFT_GEN_ALL_CORE
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 Diagnosed in 7/16, he underwent a left nephrectomy and was in remission until 8/20 when a pleural based nodule was identified. Progression was noted with a sacroiliac lytic lesion in 5/21, leading to hypercalcemia and GIB, necessitating chronic transfusions. Multiple intracerebral lesions were recently discovered, and he underwent gamma knife surgery for a frontal cerebral lesion. Dr. Coppola has been consulting with Dr. ISIDRO Michael for cancer care and was previously treated at [facility] by Dr. Corado and Dr. Hinson.    Recently, he experienced increased confusion, leading to suspicion of a UTI, for which Ciprofloxacin was started empirically. He has a history of hypercalcemia of malignancy, initially treated with Xgeva, resulting in initial hypocalcemia, now with rising Ca 2+. Following Dexamethasone administration for intracerebral lesions, profound hyperglycemia (BG up to 800mg/dl) was noted, prompting a dose reduction.    Reluctant to seek emergency care, Dr. Coppola agreed to present to the ED at his wife's insistence. The ED evaluation revealed new thrombocytopenia and a corrected calcium of 11.8. CT Head showed no new findings beyond previously identified lesions. He was treated with IV Ciprofloxacin and 1L LR and admitted.    During my visit, his daughter and wife provided most of the history. Dr. Coppola was alert and conversational with good awareness of his situation, but on rare occasion, presented inaccurate  information. Dr. Morales, a family friend, has been involved in outpatient management.    Neuro exam notable for proximal bilateral weakness, but otherwise fairly unremarkable.    Overview/Hospital Course:  No notes on file        Subjective:     Interval History: Exam unchanged; considering addition of an antiepileptic    Current Neurological Medications:   Dexamethasone    Current Facility-Administered Medications   Medication Dose Route Frequency Provider Last Rate Last Admin    acetaminophen tablet 650 mg  650 mg Oral Q8H PRN Jose Maria Taylor MD        ciprofloxacin HCl tablet 500 mg  500 mg Oral BID Jose Maria Taylor MD   500 mg at 11/18/23 0838    dexAMETHasone tablet 4 mg  4 mg Oral Q8H Jose Maria Taylor MD   4 mg at 11/18/23 1324    dextrose 10% bolus 125 mL 125 mL  12.5 g Intravenous PRN Jose Maria Taylor MD        dextrose 10% bolus 125 mL 125 mL  12.5 g Intravenous PRN Itersky, Ricci, DO        dextrose 10% bolus 250 mL 250 mL  25 g Intravenous PRN Jose Maria Taylor MD        dextrose 10% bolus 250 mL 250 mL  25 g Intravenous PRN Itersky, Ricci, DO        glucagon (human recombinant) injection 1 mg  1 mg Intramuscular PRN Itersky, Ricci, DO        glucose chewable tablet 16 g  16 g Oral PRN Itersky, Ricci, DO        glucose chewable tablet 24 g  24 g Oral PRN Itersky, Ricci, DO        HYDROcodone-acetaminophen 5-325 mg per tablet 1 tablet  1 tablet Oral Q4H PRN Jose Maria Taylor MD   1 tablet at 11/16/23 1837    insulin aspart U-100 pen 0-15 Units  0-15 Units Subcutaneous QID (AC + HS) PRN Itersky, Ricci, DO   15 Units at 11/18/23 1710    insulin NPH injection 15 Units  15 Units Subcutaneous Q8H Itersky, Ricci, DO   15 Units at 11/18/23 1521    levothyroxine tablet 200 mcg  0.2 mg Oral Before breakfast Jose Maria Taylor MD   200 mcg at 11/18/23 0527    melatonin tablet 6 mg  6 mg Oral Nightly PRN Jose Maria Taylor MD        pantoprazole EC tablet 40 mg  40 mg Oral BID AC Adele Morales MD   40 mg  at 11/18/23 1709    senna-docusate 8.6-50 mg per tablet 1 tablet  1 tablet Oral BID Jose Maria Taylor MD   1 tablet at 11/18/23 0837    sodium chloride 0.9% flush 10 mL  10 mL Intravenous PRN Jose Maria Taylor MD           Review of Systems   Constitutional:  Negative for activity change, chills and fever.   HENT:  Negative for hearing loss and sore throat.    Eyes:  Negative for pain and visual disturbance.   Respiratory:  Negative for cough and shortness of breath.    Cardiovascular:  Negative for chest pain and palpitations.   Gastrointestinal:  Negative for abdominal distention, abdominal pain, nausea and vomiting.   Genitourinary:  Negative for decreased urine volume, difficulty urinating and dysuria.   Musculoskeletal:  Negative for arthralgias and neck stiffness.   Skin:  Negative for color change and rash.   Neurological:  Positive for seizures. Negative for dizziness, weakness and headaches.   Psychiatric/Behavioral:  Negative for confusion.      Objective:     Vital Signs (Most Recent):  Temp: 97.8 °F (36.6 °C) (11/18/23 1905)  Pulse: 68 (11/18/23 1905)  Resp: 18 (11/18/23 1905)  BP: (!) 122/58 (11/18/23 1905)  SpO2: 95 % (11/18/23 1905) Vital Signs (24h Range):  Temp:  [97.5 °F (36.4 °C)-97.9 °F (36.6 °C)] 97.8 °F (36.6 °C)  Pulse:  [66-73] 68  Resp:  [18-24] 18  SpO2:  [90 %-98 %] 95 %  BP: (122-138)/(58-64) 122/58     Weight: 75.5 kg (166 lb 7.2 oz)  Body mass index is 27.7 kg/m².     Physical Exam  Vitals and nursing note reviewed.   Constitutional:       General: He is not in acute distress.     Appearance: Normal appearance. He is not toxic-appearing.   HENT:      Head: Normocephalic.      Nose: No congestion.      Mouth/Throat:      Mouth: Mucous membranes are moist.      Pharynx: No oropharyngeal exudate.   Eyes:      General: No scleral icterus.  Cardiovascular:      Rate and Rhythm: Normal rate.      Pulses: Normal pulses.   Pulmonary:      Effort: Pulmonary effort is normal. No respiratory  distress.   Abdominal:      General: Abdomen is flat. There is no distension.      Palpations: Abdomen is soft.      Tenderness: There is no abdominal tenderness.   Skin:     General: Skin is warm and dry.      Coloration: Skin is not jaundiced.   Psychiatric:         Mood and Affect: Mood normal.       Neurological Exam:  MENTAL STATUS  Level of consciousness: alert  Orientation: oriented to person, place, month and day but not year  Attention normal. Concentration normal.  Speech: normal    CRANIAL NERVES  CN II: Visual fields full to confrontation  CN III, IV, VI: PERRL, EOMI  CN V: Facial sensation intact  CN VII: Facial expression symmetric and full  CN VIII: Hearing intact to finger rub  CN IX, X: Symmetric palate elevation. Phonation normal  CN XI: Shoulder shrug and head turn intact bilaterally  CN XII: Tongue midline with normal movements, no atrophy    MOTOR EXAM  Muscle bulk: normal  Muscle tone: normal  Pronator drift: absent    Strength - Upper Extremities   Arm abduction Elbow flexion Elbow extension Wrist flexion Wrist extension Finger abduction   Right 5/5 5/5 5/5 5/5 5/5 5/5   Left 5/5 5/5 5/5 5/5 5/5 5/5     Strength - Lower Extremities   Hip flexion Knee flexion Knee extension Dorsiflexion Plantarflexion   Right 2/5 4/5 4/5 4/5 3/5   Left 2/5 4/5 4/5 4/5 3/5       REFLEXES   Biceps Triceps Brachioradialis Patellar Achilles   Right +2 +2 +2 +1 +2   Left +2 +2 +2 +1 +2     Toes equivocal bilaterally    SENSORY EXAM  Light touch: intact in all 4 extremities  Temperature: Light touch: intact in all 4 extremities    COORDINATION  Finger to nose: normal             Significant Labs: All pertinent lab results from the past 24 hours have been reviewed.    Significant Imaging: I have reviewed all pertinent imaging results/findings within the past 24 hours.  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 Diagnosed initially in 2016, he has undergone various treatments, including nephrectomy and gamma knife surgery. Currently, his symptoms include tangential thoughts and a focus on non-medical topics, suggesting cortical involvement. His recent ED visit revealed thrombocytopenia and elevated calcium levels, although a CT scan showed no new cerebral lesions. Overall, Dr. Coppola's condition is complex, with neurological symptoms likely influenced by his oncological history, recent infections, and metabolic disturbances.    From a neurological standpoint, the neurology team assesses the cause of his symptoms as multifactorial, with contributions from the UTI, brain metastasis post-radiation, hyperglycemia,, and slight hyponatremia. Given the potential risk of seizures in this patient, a routine EEG is warranted to evaluate for a seizure focus or susceptibility. This evaluation will help determine the need for antiseizure medications. If the EEG is negative, no further seizure-related workup will be necessary. The neurology team plans to follow Dr. Coppola's case peripherally, considering the complex interplay of his multiple medical conditions.    Recommendations  -- Routine EEG, pending  -- If negative, no further inpatient neurologic workup needed, otherwise, will consider AEDs  -- Thank you for the consult. Neurology will follow up peripherally        VTE Risk Mitigation (From admission, onward)           Ordered     IP VTE HIGH RISK PATIENT  Once         11/16/23 1341     Place sequential compression device  Until discontinued         11/16/23 1341                    Marvin Avalos MD  Neurology  [facility] - Neurosurgery ([facility])   Right shoulder pain

## 2023-11-20 ENCOUNTER — APPOINTMENT (OUTPATIENT)
Dept: PAIN MANAGEMENT | Facility: CLINIC | Age: 48
End: 2023-11-20

## 2023-11-30 ENCOUNTER — APPOINTMENT (OUTPATIENT)
Dept: PAIN MANAGEMENT | Facility: CLINIC | Age: 48
End: 2023-11-30

## 2023-12-14 ENCOUNTER — APPOINTMENT (OUTPATIENT)
Dept: ORTHOPEDIC SURGERY | Facility: CLINIC | Age: 48
End: 2023-12-14

## 2024-01-11 ENCOUNTER — APPOINTMENT (OUTPATIENT)
Dept: PAIN MANAGEMENT | Facility: CLINIC | Age: 49
End: 2024-01-11

## 2024-01-12 ENCOUNTER — RESULT CHARGE (OUTPATIENT)
Age: 49
End: 2024-01-12

## 2024-01-12 ENCOUNTER — APPOINTMENT (OUTPATIENT)
Dept: RADIOLOGY | Facility: CLINIC | Age: 49
End: 2024-01-12
Payer: OTHER MISCELLANEOUS

## 2024-01-12 ENCOUNTER — APPOINTMENT (OUTPATIENT)
Dept: PAIN MANAGEMENT | Facility: CLINIC | Age: 49
End: 2024-01-12
Payer: OTHER MISCELLANEOUS

## 2024-01-12 VITALS — HEIGHT: 78 IN | WEIGHT: 250 LBS | BODY MASS INDEX: 28.93 KG/M2

## 2024-01-12 DIAGNOSIS — M54.16 RADICULOPATHY, LUMBAR REGION: ICD-10-CM

## 2024-01-12 DIAGNOSIS — M54.50 LOW BACK PAIN, UNSPECIFIED: ICD-10-CM

## 2024-01-12 PROCEDURE — 99204 OFFICE O/P NEW MOD 45 MIN: CPT | Mod: ACP

## 2024-01-12 PROCEDURE — 72110 X-RAY EXAM L-2 SPINE 4/>VWS: CPT

## 2024-01-12 PROCEDURE — 80305 DRUG TEST PRSMV DIR OPT OBS: CPT | Mod: QW

## 2024-01-12 RX ORDER — MELOXICAM 15 MG/1
15 TABLET ORAL
Qty: 30 | Refills: 1 | Status: ACTIVE | COMMUNITY
Start: 2023-08-04 | End: 1900-01-01

## 2024-01-12 RX ORDER — TIZANIDINE 4 MG/1
4 TABLET ORAL
Qty: 30 | Refills: 1 | Status: ACTIVE | COMMUNITY
Start: 2022-10-31 | End: 1900-01-01

## 2024-01-12 NOTE — HISTORY OF PRESENT ILLNESS
[FreeTextEntry1] : TODAY: Patient presents to establish care. Patient is 48 y.o male who was involved in the work related accident on 06/29/2021. He has injured his right shoulder, right knee and lower back after he fell down the stairs at work. He states that he is currently not working. He states that he had no previous treatments for his lower back and there is no imaging available. He reports persistent b/l lower back pain, L>R, with radiation of pain into left lower extremity with numbness and tingling, difficulties ambulating.  He currently grades his pain as 8/10 in severity at worst and 6/10 most of the time.  We will start by working his complaints up and requesting an X-ray of lumbar spine followed by MRI in order to decide what type of interventions if any needed. He has no history of any conservative treatments done for his lower back. It is recommended to start Physical Therapy.  He was previously under the care of Ortho and was started on Meloxicam and Tizanidine. He reports those medications provided at least 45% pain relief and we will renew them today.   ADDENDUM: at the end of the visit patient started demanding pain medications in form of narcotics and I had to explain to him that his current medical condition does not require and should not be managed by narcotics at which time he become belligerent and stormed out of office saying that he will go to a different Pain Management if we are not going to prescribe pain meds for him.

## 2024-01-12 NOTE — PHYSICAL EXAM
[Normal Coordination] : normal coordination [Normal DTR UE/LE] : normal DTR UE/LE  [Normal Sensation] : normal sensation [Normal Mood and Affect] : normal mood and affect [Orientated] : orientated [Able to Communicate] : able to communicate [Normal Skin] : normal skin [No Rash] : no rash [No Ulcers] : no ulcers [No Lesions] : no lesions [No obvious lymphadenopathy in areas examined] : no obvious lymphadenopathy in areas examined [Well Developed] : well developed [Well Nourished] : well nourished [Flexion] : flexion [Extension] : extension [Bending to left] : bending to left [Bending to right] : bending to right [4___] : right extensor hallicus longus 4[unfilled]/5 [] : positive straight leg raise

## 2024-01-12 NOTE — DISCUSSION/SUMMARY
[Medication Risks Reviewed] : Medication risks reviewed [de-identified] : 48 y.o. male patient with persistent chronic b/l lower back pain as a result of work related accident from 06/29/2021. We will start complete work up of his symptoms by ordering Lumbar spine X-ray, followed by MRI and will start him on Physical Therapy. We will continue prescribing him Meloxicam and Tizanidine. We will re-evaluate in 8 weeks.  Physical therapy of the Lumbar spine 2-3 times a week for 4-8 weeks stressing a home exercise program of walking, shoulder griddle strengthening, swimming, elliptical , recumbent bike, Matt chi and Yoga. Use things that heat like hot shower or icy heat before rehab, exercising and at the beginning of the day, and ice (ice in a bag never directly on the skin) after activity and at the end of the day.  Total clinician time spent today on the patient is 55 minutes including preparing to see the patient, obtaining and/or reviewing and confirming history, performing medically necessary and appropriate examination, counseling and educating the patient and/or family, documenting clinical information in the EHR and communicating and/or referring to other healthcare professionals.  ERMELINDA Burns D.O.  ADDENDUM: at the end of the visit patient started demanding pain medications in form of narcotics and I had to explain to him that his current medical condition does not require and should not be managed by narcotics at which time he become belligerent and stormed out of office saying that he will go to a different Pain Management if we are not going to prescribe pain meds for him.   Ap Borden PA-C

## 2024-02-08 ENCOUNTER — APPOINTMENT (OUTPATIENT)
Dept: ORTHOPEDIC SURGERY | Facility: CLINIC | Age: 49
End: 2024-02-08
Payer: OTHER MISCELLANEOUS

## 2024-02-08 PROCEDURE — 99214 OFFICE O/P EST MOD 30 MIN: CPT

## 2024-02-08 NOTE — HISTORY OF PRESENT ILLNESS
[de-identified] : Pt is s/p left shoulder arthroscopy Doing well. Pain controlled  Still having pain and soreness but rom improving  NAD Left shoulder Incisions healed Mild diffuse TTP Compartments soft and NT  abd 80 ER 30 IR L5 NVI  s/p left shoulder arthroscopy went over the surgery in detail needs PT, this is medically necessary to improve rom and function, new script provided will cont hep mobic sent for pain due to cont pain, will refer to PM fu in 3 months not working with temp total deg of disability

## 2024-02-09 NOTE — H&P PST ADULT - NSANTHBMIRD_ENT_A_CORE
Sacroiliac Joint Injection under Fluoroscopic Guidance    The procedure, risks, benefits, and options were discussed with the patient. There are no contraindications to the procedure. The patent expressed understanding and agreed to the procedure. Informed written consent was obtained prior to the start of the procedure and can be found in the patient's chart.    PATIENT NAME: Joseph Pepper   MRN: [id number]     DATE OF PROCEDURE: 02/09/2024    PROCEDURE: Right Sacroiliac Joint Injection under Fluoroscopic Guidance    PRE-OP DIAGNOSIS: Chronic sacroiliac joint pain [M53.3, G89.29]    POST-OP DIAGNOSIS: Same    PHYSICIAN: Dez Gonzales MD    ASSISTANTS: andrea     MEDICATIONS INJECTED: Preservative-free Kenalog 40mg with 3cc of Bupivacine 0.5%     LOCAL ANESTHETIC INJECTED: Xylocaine 1%     SEDATION: None    ESTIMATED BLOOD LOSS: None    COMPLICATIONS: None    TECHNIQUE: Time-out was performed to identify the patient and procedure to be performed. With the patient laying in a prone position, the surgical area was prepped and draped in the usual sterile fashion using ChloraPrep and a fenestrated drape. The sacroiliac joint was determined under fluoroscopy guidance. Skin anesthesia was achieved by injecting Lidocaine 2% over the injection site. The sacroiliac joint was  then approached with a 22 gauge, 3.5 inch spinal quinke needle that was introduced under fluoroscopic guidance in the AP and Lateral views. Once the needle tip was in the area of the joint, and there was no blood, contrast dye Omnipaque (300mg/mL) was injected to confirm placement and there was no vascular runoff. Fluoroscopic imaging in the AP and lateral views revealed a clear outline of the joint space. 5 mL of the medication mixture listed above was injected slowly intraarticular and marci-articular. Displacement of the radio opaque contrast after injection of the medication confirmed that the medication went into the area of the joint. The needles were  removed and bleeding was nil.  A sterile dressing was applied. No specimens collected. The patient tolerated the procedure well.     The patient was monitored after the procedure in the recovery area. They were given post-procedure and discharge instructions to follow at home. The patient was discharged in a stable condition.  Dez Gonzales MD     No

## 2024-05-03 NOTE — PRE-ANESTHESIA EVALUATION ADULT - NSANTHTOTALSCORECAL_ENT_A_CORE
Nutrition Services:    Met with patient and Anita during OTV for nutrition follow up.     Experiencing ongoing odynophagia when opening jaw, persistent trismus which  limit po intake. Was able to tolerate 1 small slice of pzizza and small slice of kringle yesterday    Today reports he has stopped drinking beer. Continues to drink 2-3 glasses of water per day. Finds it difficult to drink water, was not a baseline habit.     Last bowel movement yesterday, they are tracking bowel movement frequency on a calendar at home.     For tube feedings took 1 carton formula yesterday and 1 carton this morning.   They verbalized understanding of goal tube feeding schedule. No further nutrition related questions/concerns this date. Reviewed with Provider as well.   
1

## 2024-05-09 ENCOUNTER — APPOINTMENT (OUTPATIENT)
Dept: ORTHOPEDIC SURGERY | Facility: CLINIC | Age: 49
End: 2024-05-09
Payer: OTHER MISCELLANEOUS

## 2024-05-09 DIAGNOSIS — S46.912A STRAIN OF UNSPECIFIED MUSCLE, FASCIA AND TENDON AT SHOULDER AND UPPER ARM LEVEL, LEFT ARM, INITIAL ENCOUNTER: ICD-10-CM

## 2024-05-09 PROCEDURE — 99214 OFFICE O/P EST MOD 30 MIN: CPT

## 2024-05-09 RX ORDER — MELOXICAM 15 MG/1
15 TABLET ORAL
Qty: 30 | Refills: 1 | Status: ACTIVE | COMMUNITY
Start: 2024-02-08 | End: 1900-01-01

## 2024-05-09 NOTE — HISTORY OF PRESENT ILLNESS
[de-identified] : Pt is s/p left shoulder arthroscopy Doing well. Pain controlled  Still having pain and soreness but rom improving  NAD Left shoulder Incisions healed Mild diffuse TTP Compartments soft and NT  abd 80 ER 30 IR L5 NVI  s/p left shoulder arthroscopy went over the surgery in detail needs PT, this is medically necessary to improve rom and function, new script provided will cont hep mobic sent for pain cont PM evaluation fu in 3 months  not working with temp total deg of disability

## 2024-08-08 ENCOUNTER — APPOINTMENT (OUTPATIENT)
Dept: ORTHOPEDIC SURGERY | Facility: CLINIC | Age: 49
End: 2024-08-08

## 2024-08-30 ENCOUNTER — APPOINTMENT (OUTPATIENT)
Dept: ORTHOPEDIC SURGERY | Facility: CLINIC | Age: 49
End: 2024-08-30
Payer: OTHER MISCELLANEOUS

## 2024-08-30 DIAGNOSIS — S46.912A STRAIN OF UNSPECIFIED MUSCLE, FASCIA AND TENDON AT SHOULDER AND UPPER ARM LEVEL, LEFT ARM, INITIAL ENCOUNTER: ICD-10-CM

## 2024-08-30 DIAGNOSIS — S49.92XA UNSPECIFIED INJURY OF LEFT SHOULDER AND UPPER ARM, INITIAL ENCOUNTER: ICD-10-CM

## 2024-08-30 PROCEDURE — 99213 OFFICE O/P EST LOW 20 MIN: CPT

## 2024-08-30 NOTE — HISTORY OF PRESENT ILLNESS
[de-identified] : Pt is s/p left shoulder arthroscopy still having pain  Still having pain and soreness but rom improving  is also having back pain that is affecting qol  NAD Left shoulder Incisions healed Mild diffuse TTP Compartments soft and NT  abd 80 ER 30 IR L5 NVI  s/p left shoulder arthroscopy went over the surgery in detail needs PT, this is medically necessary to improve rom and function, new script provided will cont hep cont PM  fu in 3-4 months  not working with temp total deg of disability

## 2024-12-03 ENCOUNTER — APPOINTMENT (OUTPATIENT)
Dept: ORTHOPEDIC SURGERY | Facility: CLINIC | Age: 49
End: 2024-12-03
Payer: OTHER MISCELLANEOUS

## 2024-12-03 DIAGNOSIS — S49.92XA UNSPECIFIED INJURY OF LEFT SHOULDER AND UPPER ARM, INITIAL ENCOUNTER: ICD-10-CM

## 2024-12-03 PROCEDURE — 99213 OFFICE O/P EST LOW 20 MIN: CPT

## 2024-12-05 NOTE — DISCUSSION/SUMMARY
Per Dr. Yu:  Recommend Echo and follow up after (first available)   [de-identified] : IMPRESSION: Left shoulder pain possibly due to partial tear to the rotator cuff.\par Right hip strain/improving\par Cervical strain.\par Finger sprains\par \par PLAN: Patient has schedule appointment to do MRI of the left shoulder in about a week, patient is in severe pain, patient was offered a cortisone injection to Left shoulder patient agrees.\par Patient will continue with physical therapy.\par Several family were given, appointment to see our spine Ortho was given in about 4 weeks.\par Patient was advised to be evaluated by a hand specialist for his hand sprain.\par Patient was advised to follow-up with our shoulder specialist after the MRI.\par Patient remains throughout the brace if unable to return to work until further notice.\par \par FOLLOW-UP: 4 to 6 weeks\par \par SUPERVISING PHYSICIAN DR. CALDWELL\par

## 2025-04-08 ENCOUNTER — APPOINTMENT (OUTPATIENT)
Dept: ORTHOPEDIC SURGERY | Facility: CLINIC | Age: 50
End: 2025-04-08

## 2025-04-08 DIAGNOSIS — S46.912A STRAIN OF UNSPECIFIED MUSCLE, FASCIA AND TENDON AT SHOULDER AND UPPER ARM LEVEL, LEFT ARM, INITIAL ENCOUNTER: ICD-10-CM

## 2025-04-08 PROCEDURE — 99214 OFFICE O/P EST MOD 30 MIN: CPT | Mod: 25

## 2025-04-08 PROCEDURE — 20611 DRAIN/INJ JOINT/BURSA W/US: CPT | Mod: LT

## 2025-08-05 ENCOUNTER — APPOINTMENT (OUTPATIENT)
Dept: ORTHOPEDIC SURGERY | Facility: CLINIC | Age: 50
End: 2025-08-05
Payer: OTHER MISCELLANEOUS

## 2025-08-05 DIAGNOSIS — S46.912A STRAIN OF UNSPECIFIED MUSCLE, FASCIA AND TENDON AT SHOULDER AND UPPER ARM LEVEL, LEFT ARM, INITIAL ENCOUNTER: ICD-10-CM

## 2025-08-05 PROCEDURE — 99213 OFFICE O/P EST LOW 20 MIN: CPT
